# Patient Record
Sex: FEMALE | Race: WHITE | Employment: FULL TIME | URBAN - METROPOLITAN AREA
[De-identification: names, ages, dates, MRNs, and addresses within clinical notes are randomized per-mention and may not be internally consistent; named-entity substitution may affect disease eponyms.]

---

## 2018-07-03 ENCOUNTER — HOSPITAL ENCOUNTER (EMERGENCY)
Facility: CLINIC | Age: 23
Discharge: HOME | End: 2018-07-03
Attending: EMERGENCY MEDICINE | Admitting: EMERGENCY MEDICINE
Payer: COMMERCIAL

## 2018-07-03 VITALS
DIASTOLIC BLOOD PRESSURE: 72 MMHG | BODY MASS INDEX: 23.32 KG/M2 | WEIGHT: 140 LBS | RESPIRATION RATE: 18 BRPM | OXYGEN SATURATION: 99 % | TEMPERATURE: 98.2 F | HEIGHT: 65 IN | HEART RATE: 89 BPM | SYSTOLIC BLOOD PRESSURE: 100 MMHG

## 2018-07-03 DIAGNOSIS — T78.40XA ALLERGIC REACTION, INITIAL ENCOUNTER: ICD-10-CM

## 2018-07-03 DIAGNOSIS — R21 RASH: ICD-10-CM

## 2018-07-03 PROCEDURE — 99282 EMERGENCY DEPT VISIT SF MDM: CPT

## 2018-07-03 RX ORDER — PREDNISONE 20 MG/1
TABLET ORAL
Qty: 10 TABLET | Refills: 0 | Status: SHIPPED | OUTPATIENT
Start: 2018-07-03

## 2018-07-03 ASSESSMENT — ENCOUNTER SYMPTOMS: TROUBLE SWALLOWING: 0

## 2018-07-03 NOTE — ED AVS SNAPSHOT
Emergency Department    [address] [place] [address]    Phone:  [phone]    Fax:  [phone]                                       Iris Lam   MRN: [id number]    Department:   Emergency Department   Date of Visit:  7/3/2018           After Visit Summary Signature Page     I have received my discharge instructions, and my questions have been answered. I have discussed any challenges I see with this plan with the nurse or doctor.    ..........................................................................................................................................  Patient/Patient Representative Signature      ..........................................................................................................................................  Patient Representative Print Name and Relationship to Patient    ..................................................               ................................................  Date                                            Time    ..........................................................................................................................................  Reviewed by Signature/Title    ...................................................              ..............................................  Date                                                            Time

## 2018-07-03 NOTE — ED AVS SNAPSHOT
Emergency Department    [address] [facility] [address]    Phone:  [phone]    Fax:  [phone]                                       Iris Lam   MRN: [id number]    Department:   Emergency Department   Date of Visit:  7/3/2018           Patient Information     Date Of Birth          1995        Your diagnoses for this visit were:     Allergic reaction, initial encounter     Rash        You were seen by Patrick Rivera DO.      Follow-up Information     Follow up with [facility] In 2 days.    Specialties:  Podiatry, Internal Medicine, Family Medicine    Contact information:    [address] [facility] [address]  [facility] [id number] [phone]        Follow up with  Emergency Department.    Specialty:  EMERGENCY MEDICINE    Why:  If symptoms worsen    Contact information:    [address] [facility] [address] [phone]        Discharge Instructions         Medicine Reaction: Allergic  You are having an allergic reaction to a medicine you have taken. This causes an itchy rash and sometimes swelling of various parts of the body. It may also cause trouble swallowing or breathing. The rash may take a few hours or up to 2 weeks to go away. In the future, remember to tell your healthcare provider about your allergy to this medicine so that medicines of this type won't be used again.  Any medicine can cause an allergic reaction. But the most allergic reactions are caused by:    Penicillin and related medicines    Aspirin    Ibuprofen    Seizure medicines  Vaccines may also trigger allergies. People whose parents or siblings have allergies are at a higher risk of developing a medicine allergy. Allergy testing may sometimes be needed to figure out the cause.  Symptoms may occur within minutes, hours, or even weeks after exposure to the medicine. It can be a mild or severe reaction, or potentially life threatening. Most of us think of  allergic reactions when we have a rash or itchy skin. Symptoms can include:    Rash, hives, redness, welts, blisters    Itching, burning, stinging, pain    Dry, flaky, cracking, scaly skin    Belly (abdominal) cramps or nausea or stomach pain    Fever.  Sometimes fever is the only symptom of a drug reaction. In older adults, the risk of fever increases with the number of medicines the person takes.  More severe symptoms include:    Swelling of the face or lips, or drooling    Trouble swallowing, feeling like your throat is closing    Trouble breathing, wheezing    Hoarse voice or trouble speaking    Severe nausea or vomiting or diarrhea      Feeling faint or lightheaded, rapid heart rate  Home care    The goal of treatment is to help relieve the symptoms, and get you feeling better. Mild to medium medicine reactions usually respond quickly to antihistamines and steroids. The rash will usually fade over several days. But it can sometimes last a couple of weeks. Over the next couple of days, there may be times when it is gets a little worse, and then better again. Here are some things to do:    Throw the medicine away and don t take it again. The next reaction may be much worse.    Add this medicine reaction to your electronic medical record.    When getting a new medicine, always tell the healthcare provider that you are allergic to this medicine. Make certain the provider writes it down in your medical record.    Avoid tight clothing and anything that heats up your skin (hot showers or baths, direct sunlight). Heat will make itching worse.    An ice pack will relieve local areas of intense itching and redness. To make an ice pack, put ice cubes in a plastic bag that seals at the top. Wrap the bag in a clean, thin towel or cloth. Don t put ice directly on the skin.    To help prevent an infection, don't scratch the affected area. Scratching may worsen the reaction. It can damage your skin and lead to an infection.  Always check the affected site for signs of an infection.    Your provider may give you a prescription antihistamine.    If you are not given a prescription antihistamine, oral diphenhydramine is an over-the-counter antihistamine available at pharmacies and grocery stores. This may be used to reduce itching if large areas of the skin are involved. This antihistamine may make you sleepy, so be careful using it in the daytime or when going to school, working, or driving. Note: Don t use diphenhydramine if you have glaucoma or if you are a man with trouble urinating due to an enlarged prostate. There are other antihistamines that cause less drowsiness and are a good choice for daytime use. Ask your pharmacist for suggestions.    Don't use diphenhydramine cream on your skin. It can cause a further skin reaction for some people.    Contact your healthcare provider and ask what can be used on the affected area to help decrease the itching.  Follow-up care  Follow up with your healthcare provider, or as advised if your symptoms do not continue to improve or they get worse.  Call 911  Call 911 if any of these occur:    Shortness of breath    Cool, moist, pale skin    Swelling in the face, eyelids, mouth, tongue, or lips    Drooling    Trouble breathing or swallowing, wheezing    New or worsening swelling in the mouth, throat, or tongue    Hoarse voice or trouble speaking     Fainting or loss of consciousness    Rapid heart rate    Feeling of dizziness or weakness or a sudden drop in blood pressure    Feeling of doom    Feeling lightheaded    Severe nausea, vomiting, or diarrhea  When to seek medical advice  Call your healthcare provider right away if any of these occur:    Continuing or recurring symptoms    Nausea, abdominal cramps or stomach pain    Spreading areas of itching, redness or swelling    Signs of infection:  ? Spreading redness  ? Increased pain or swelling  ? Fever of 100.4 F (38 C) or above lasting for 24 to  48 hours, or as directed by your provider  ? Fluid or colored drainage from the affected area  Date Last Reviewed: 3/1/2017    2525-4648 [employer]. [address] [place] [address], [place] [address]. All rights reserved. This information is not intended as a substitute for professional medical care. Always follow your healthcare professional's instructions.          24 Hour Appointment Hotline       To make an appointment at any [facility], call [phone] ([phone]). If you don't have a family doctor or clinic, we will help you find one. [facility] are conveniently located to serve the needs of you and your family.             Review of your medicines      START taking        Dose / Directions Last dose taken    predniSONE 20 MG tablet   Commonly known as:  DELTASONE   Quantity:  10 tablet        Take two tablets (= 40mg) each day for 5 (five) days   Refills:  0                Prescriptions were sent or printed at these locations (1 Prescription)                   [facility] [place] - [address]   [address], [place] [address], [facility] [address]    Telephone:  [phone]   Fax:  [phone]   Hours:                  E-Prescribed (1 of 1)         predniSONE (DELTASONE) 20 MG tablet                Orders Needing Specimen Collection     None      Pending Results     No orders found from 7/1/2018 to 7/4/2018.            Pending Culture Results     No orders found from 7/1/2018 to 7/4/2018.            Pending Results Instructions     If you had any lab results that were not finalized at the time of your Discharge, you can call the ED Lab Result RN at [phone]. You will be contacted by this team for any positive Lab results or changes in treatment. The nurses are available 7 days a week from 10A to 6:30P.  You can leave a message 24 hours per day and they will return your call.        Test Results From Your Hospital Stay               Clinical Quality Measure:  Blood Pressure Screening     Your blood pressure was checked while you were in the emergency department today. The last reading we obtained was  BP: 100/72 . Please read the guidelines below about what these numbers mean and what you should do about them.  If your systolic blood pressure (the top number) is less than 120 and your diastolic blood pressure (the bottom number) is less than 80, then your blood pressure is normal. There is nothing more that you need to do about it.  If your systolic blood pressure (the top number) is 120-139 or your diastolic blood pressure (the bottom number) is 80-89, your blood pressure may be higher than it should be. You should have your blood pressure rechecked within a year by a primary care provider.  If your systolic blood pressure (the top number) is 140 or greater or your diastolic blood pressure (the bottom number) is 90 or greater, you may have high blood pressure. High blood pressure is treatable, but if left untreated over time it can put you at risk for heart attack, stroke, or kidney failure. You should have your blood pressure rechecked by a primary care provider within the next 4 weeks.  If your provider in the emergency department today gave you specific instructions to follow-up with your doctor or provider even sooner than that, you should follow that instruction and not wait for up to 4 weeks for your follow-up visit.        Thank you for choosing [facility]       Thank you for choosing [facility] for your care. Our goal is always to provide you with excellent care. Hearing back from our patients is one way we can continue to improve our services. Please take a few minutes to complete the written survey that you may receive in the mail after you visit with us. Thank you!        [employer] Information     [employer] lets you send messages to your doctor, view your test results, renew your prescriptions, schedule appointments and more. To sign up, go to  [email or web address] . Click on \"Log in\" on the left side of the screen, which will take you to the Welcome page. Then click on \"Sign up Now\" on the right side of the page.     You will be asked to enter the access code listed below, as well as some personal information. Please follow the directions to create your username and password.     Your access code is: [email or web address]  Expires: 10/1/2018  4:28 PM     Your access code will  in 90 days. If you need help or a new code, please call your [facility] clinic or [phone].        Care EveryWhere ID     This is your Care EveryWhere ID. This could be used by other organizations to access your [facility] medical records  [id number]        Equal Access to Services     NICK BAER : Neil Corey, ang farrell, divina sinclair, wilmer denson . So [facility] [phone].    ATENCIÓN: Si habla español, tiene a jj disposición servicios gratuitos de asistencia lingüística. Llame al [phone].    We comply with applicable federal civil rights laws and [place] laws. We do not discriminate on the basis of race, color, national origin, age, disability, sex, sexual orientation, or gender identity.            After Visit Summary       This is your record. Keep this with you and show to your community pharmacist(s) and doctor(s) at your next visit.                  "

## 2018-07-03 NOTE — ED PROVIDER NOTES
"  History     Chief Complaint:  Allergic Reaction    The history is provided by the patient.      Iris Lam is a 23 year old female who presents to the emergency department with her sister for evaluation of allergic reaction. Eight days ago the patient was seen in the emergency department for treatment of UTI in which she was prescribed bactrim for a 10 day course. The patient notes that the last dose of bactrim she recalls taking was around 2030 last night. This afternoon, the patient noted the onset of a rash and was prompted to seek evaluation here in the emergency department. Here, the patient complains of the rash and itching that comes along with it. The patient denies any accompanying difficulty in breathing or swallowing and denies any swelling in her tongue.     Allergies:  Bactrim     Medications:    The patient is not currently taking any prescribed medications.    Past Medical History:    The patient denies any significant past medical history.    Past Surgical History:    The patient does not have any pertinent past surgical history.    Family History:    No past pertinent family history.    Social History:  Marital Status: Unknown  Tobacco Use: No  Alcohol Use: No       Review of Systems   HENT: Negative for trouble swallowing.    Respiratory:        Negative for difficulty breathing      Skin: Positive for rash.       Physical Exam     Patient Vitals for the past 24 hrs:   BP Temp Temp src Pulse Resp SpO2 Height Weight   07/03/18 1611 100/72 98.2  F (36.8  C) Oral 89 18 99 % 1.651 m (5' 5\") 63.5 kg (140 lb)         Physical Exam  Physical Exam   General:  Sitting on bed with sister at bedside.   HENT:  No obvious trauma to head. No swelling to tongue, lips or posterior oropharynx.  Right Ear:  External ear normal.   Left Ear:  External ear normal.   Nose:  Nose normal.   Eyes:  Conjunctivae and EOM are normal. Pupils are equal, round, and reactive.   Neck: Normal range of motion. Neck supple. No " tracheal deviation present.   CV:  Normal heart sounds. No murmur heard.  Pulm/Chest: Effort normal and breath sounds normal. No wheezing.  Abd: Soft. No distension. There is no tenderness. There is no rigidity, no rebound and no guarding.   M/S: Normal range of motion.   Neuro: Alert. GCS 15.  Skin: Skin is warm. Not diaphoretic. No mucosal ulcers, no vesicals or blisters. Diffused rash head to toe, but most prominent on the trunk. Blanchable, erythematous, macular, palpable rash on trunk.  Psych: Normal mood and affect. Behavior is normal.       Emergency Department Course     Emergency Department Course:  1615 Nursing notes and vitals reviewed. I performed an exam of the patient as documented above.     1625 I rechecked the patient and discussed the results of her workup thus far.     Findings and plan explained to the patient. Patient discharged home with instructions regarding supportive care, medications, and reasons to return. The importance of close follow-up was reviewed.     I personally answered all related questions prior to discharge.       Impression & Plan      Medical Decision Making:  Iris Lam is a very pleasant 23 year old female who presents for evaluation of a rash.  This seems consistent with a drug rash based on recent use of Bactrim. A broad differential was considered including serum sickness, hypersensitivity vasculitis, toxic epidermal necrolysis, rivera-reece syndrome, allergic phenomena/urticaria, reactive arthritis, viral rash, etc.  Based on history, exam and especially skin findings, believe this rash is a classic, type 3 hypersensitivity reaction from sulfa on day 8 of taking the medicine. There is no evidence of Rivera-Recee Syndrome. Supportive outpatient management is indicated.  Would not hospitalize at this point. Patient has some itching. Reviewed that treatment for this is discontinuing the abx. The patient already has and has no UTI symptoms now. No need for  repeat UA test. Patient also requested prednisone for itching. Patient will also take benadryl prn.  Gave strict instructions when to return including fever more than 101, blisters on skin/eyes/mouth, worsening pain.      The treatment plan was discussed with the patient and they expressed understanding of this plan and consented to the plan.  In addition, the patient will return to the emergency department if their symptoms persist, worsen, if new symptoms arise or if there is any concern as other pathology may be present that is not evident at this time. They also understand the importance of close follow up in the clinic and if unable to do so will return to the emergency department for a reevaluation. All questions were answered.    Diagnosis:    ICD-10-CM    1. Allergic reaction, initial encounter T78.40XA    2. Rash R21        Disposition:  discharged to home    Discharge Medications:  New Prescriptions    PREDNISONE (DELTASONE) 20 MG TABLET    Take two tablets (= 40mg) each day for 5 (five) days     Scribe Disclosure:  I, Rad White, am serving as a scribe on 7/3/2018 at 4:16 PM to personally document services performed by Patrick Rivera DO based on my observations and the provider's statements to me.       Rad White  7/3/2018    EMERGENCY DEPARTMENT       Patrick Rivera DO  07/03/18 [id number]

## 2018-07-03 NOTE — DISCHARGE INSTRUCTIONS
Medicine Reaction: Allergic  You are having an allergic reaction to a medicine you have taken. This causes an itchy rash and sometimes swelling of various parts of the body. It may also cause trouble swallowing or breathing. The rash may take a few hours or up to 2 weeks to go away. In the future, remember to tell your healthcare provider about your allergy to this medicine so that medicines of this type won't be used again.  Any medicine can cause an allergic reaction. But the most allergic reactions are caused by:    Penicillin and related medicines    Aspirin    Ibuprofen    Seizure medicines  Vaccines may also trigger allergies. People whose parents or siblings have allergies are at a higher risk of developing a medicine allergy. Allergy testing may sometimes be needed to figure out the cause.  Symptoms may occur within minutes, hours, or even weeks after exposure to the medicine. It can be a mild or severe reaction, or potentially life threatening. Most of us think of allergic reactions when we have a rash or itchy skin. Symptoms can include:    Rash, hives, redness, welts, blisters    Itching, burning, stinging, pain    Dry, flaky, cracking, scaly skin    Belly (abdominal) cramps or nausea or stomach pain    Fever.  Sometimes fever is the only symptom of a drug reaction. In older adults, the risk of fever increases with the number of medicines the person takes.  More severe symptoms include:    Swelling of the face or lips, or drooling    Trouble swallowing, feeling like your throat is closing    Trouble breathing, wheezing    Hoarse voice or trouble speaking    Severe nausea or vomiting or diarrhea      Feeling faint or lightheaded, rapid heart rate  Home care    The goal of treatment is to help relieve the symptoms, and get you feeling better. Mild to medium medicine reactions usually respond quickly to antihistamines and steroids. The rash will usually fade over several days. But it can sometimes last a  couple of weeks. Over the next couple of days, there may be times when it is gets a little worse, and then better again. Here are some things to do:    Throw the medicine away and don t take it again. The next reaction may be much worse.    Add this medicine reaction to your electronic medical record.    When getting a new medicine, always tell the healthcare provider that you are allergic to this medicine. Make certain the provider writes it down in your medical record.    Avoid tight clothing and anything that heats up your skin (hot showers or baths, direct sunlight). Heat will make itching worse.    An ice pack will relieve local areas of intense itching and redness. To make an ice pack, put ice cubes in a plastic bag that seals at the top. Wrap the bag in a clean, thin towel or cloth. Don t put ice directly on the skin.    To help prevent an infection, don't scratch the affected area. Scratching may worsen the reaction. It can damage your skin and lead to an infection. Always check the affected site for signs of an infection.    Your provider may give you a prescription antihistamine.    If you are not given a prescription antihistamine, oral diphenhydramine is an over-the-counter antihistamine available at pharmacies and grocery stores. This may be used to reduce itching if large areas of the skin are involved. This antihistamine may make you sleepy, so be careful using it in the daytime or when going to school, working, or driving. Note: Don t use diphenhydramine if you have glaucoma or if you are a man with trouble urinating due to an enlarged prostate. There are other antihistamines that cause less drowsiness and are a good choice for daytime use. Ask your pharmacist for suggestions.    Don't use diphenhydramine cream on your skin. It can cause a further skin reaction for some people.    Contact your healthcare provider and ask what can be used on the affected area to help decrease the itching.  Follow-up  care  Follow up with your healthcare provider, or as advised if your symptoms do not continue to improve or they get worse.  Call 911  Call 911 if any of these occur:    Shortness of breath    Cool, moist, pale skin    Swelling in the face, eyelids, mouth, tongue, or lips    Drooling    Trouble breathing or swallowing, wheezing    New or worsening swelling in the mouth, throat, or tongue    Hoarse voice or trouble speaking     Fainting or loss of consciousness    Rapid heart rate    Feeling of dizziness or weakness or a sudden drop in blood pressure    Feeling of doom    Feeling lightheaded    Severe nausea, vomiting, or diarrhea  When to seek medical advice  Call your healthcare provider right away if any of these occur:    Continuing or recurring symptoms    Nausea, abdominal cramps or stomach pain    Spreading areas of itching, redness or swelling    Signs of infection:  ? Spreading redness  ? Increased pain or swelling  ? Fever of 100.4 F (38 C) or above lasting for 24 to 48 hours, or as directed by your provider  ? Fluid or colored drainage from the affected area  Date Last Reviewed: 3/1/2017    5681-7202 [employer]. [address] [place] [address], [place] [address]. All rights reserved. This information is not intended as a substitute for professional medical care. Always follow your healthcare professional's instructions.